# Patient Record
Sex: FEMALE | Race: AMERICAN INDIAN OR ALASKA NATIVE
[De-identification: names, ages, dates, MRNs, and addresses within clinical notes are randomized per-mention and may not be internally consistent; named-entity substitution may affect disease eponyms.]

---

## 2017-09-19 ENCOUNTER — HOSPITAL ENCOUNTER (OUTPATIENT)
Dept: HOSPITAL 45 - C.MAMM | Age: 42
Discharge: HOME | End: 2017-09-19
Attending: INTERNAL MEDICINE
Payer: COMMERCIAL

## 2017-09-19 DIAGNOSIS — Z12.39: Primary | ICD-10-CM

## 2017-09-19 DIAGNOSIS — N60.12: ICD-10-CM

## 2017-09-19 DIAGNOSIS — N60.11: ICD-10-CM

## 2017-09-20 NOTE — MAMMOGRAPHY REPORT
SCREENING ULTRASOUND OF BOTH BREASTS: 9/19/2017

CLINICAL HISTORY: 43-year-old woman presents for breast screening.  She is declining mammography as a
rticles she has read reportedly raised a concern of mammograms causing breast cancer.  Therefore she 
presents for whole breast screening ultrasound.  





COMPARISON: No prior exams were available for comparison.   



FINDINGS:  Real-time high-resolution sonographic evaluation was performed throughout each breast incl
uding the axillae.   Morphologically normal lymph nodes are identified in the right and left axilla, 
without evidence of suspicious lymphadenopathy.



The breast parenchymal echotexture is heterogeneousdense.  In the left 3:00 axis, 5 cm from the nipp
le, there is a lobulated parallel circumscribed hypoechoic benign-appearing solid mass versus promine
nt fat lobule measuring 5.4 x 3.0 x 7.8 mm.  In the 5:00 left breast, 3 cm from the nipple, another l
obulated hypoechoic solid versus cystic mass is identified, measuring 4.6 x 2.7 x 3.9 mm.  A round an
echoic benign simple cyst with posterior acoustic enhancement is identified in the left 7:00 breast, 
2 cm from the nipple, measuring 3.1 x 2.3 x 2.9 mm.  A parallel anechoic benign simple cyst identifie
d in the 6:30 left breast, 2 cm from the nipple measures 3.4 x 1.8 x 5.2 mm.  A grouping of probable 
cysts are identified in the 11:00 left breast, 4 cm from the nipple, measuring approximately 11 mm in
 conglomerate.  No highly suspicious solid mass is identified in the left breast, including the retro
areolar breast.



In the right breast 12:00 axis, 1 cm from the nipple, there is an oval parallel circumscribed anechoi
c benign simple cyst measuring 5.1 x 5.7 x 2.0 mm.  A lobulated hypoechoic solid versus cystic mass i
n the 3:00 right breast, 2 cm from the nipple, measures 5.2 x 3.5 x 3.0 mm.  Hypoechoic nodular tissu
e is seen in the 4:00 right breast, 9 cm from the nipple, measuring approximately 15 mm in conglomera
te, which could simply represent stromal fibrosis or normal tissue.  A tiny round hypoechoic cystic-a
ppearing mass in the 5:00 right breast, 4 cm from the nipple, measures 2.0 x 2.1 x 2.3 mm.  An isoech
oic mass versus prominent fat lobule is seen in the 7:00 right breast, 5 cm from the nipple, measurin
g 9.2 x 3.9 x 7.2 mm.  A parallel isoechoic solid-appearing mass in the 9:00 right breast, 7 cm from 
the nipple, measures 5.1 x 2.3 x 5.1 mm.  No highly suspicious solid mass is seen throughout the righ
t breast including the retroareolar breast.



All of the above described masses in both breasts most likely represent benign fibrocystic changes an
d benign masses such as fibroadenomas.  However, given the indeterminate solid versus cystic nature o
f some of the benign appearing masses, a short interval follow-up targeted bilateral breast ultrasoun
d is recommend to ensure stability in 6 months.



IMPRESSION: ACR-BI-RADS CATEGORY 3: PROBABLY BENIGN - FOLLOW-UP RECOMMENDED

1.  There are fibrocystic changes in both breasts on ultrasound, with several anechoic benign simple 
cysts identified.  2.  However, there are indeterminate solid versus cystic benign-appearing masses v
ersus prominent fat lobules also seen in both breasts for which a short interval follow-up bilateral 
targeted ultrasound is recommended to ensure stability in 6 months.  In particular, attention to the 
right breast 3:00, 4:00, 5:00, 7:00 and 9:00 axes, and left breast 3:00, 5:00 and 11:00 axes.

3.  I also discussed with the patient that mammography is the only proven modality for reducing breas
t cancer mortality and this is the gold standard for screening breast evaluation.  Ultrasound may not
 demonstrate microcalcifications, which are well seen mammographically, and can be the earliest sign 
of atypia or breast cancer.  Articles that I am aware of have not demonstrated screening mammography 
to cause breast cancer.  I would urge her to consider screening mammography in the future, but if she
 still declines that exam whole breast screening ultrasounds seem reasonable although suboptimal.



Megan Duarte M.D.  

ay/:9/19/2017 13:35:16  



Imaging Technologist: Jazmín DON)(TAY), [facility]

letter sent: Follow Up Recommended 3  

BI-RADS Code: ACR-BI-RADS Category 3: Probably Benign

## 2017-09-25 NOTE — CODING QUERY NO DIAGNOSIS
: 1975

TREATMENT RENDERED WITHOUT A DIAGNOSIS                                                  



To promote full compliance with coding requirements relating to patient care, physician 
participation is requested in all cases of  uncertainty.  Please assist us with 
providing a diagnosis/symptom for the test(s) below:



A diagnosis/symptom was not documented on your Order.  A valid diagnosis/symptom is 
required to bill all insurances.



**Please remember that we are unable to code a diagnosis of rule out, probable, possible, 
questionable, or suspected.  



Tests that require a diagnosis:

DOS: 17

* Breast Ultrasound            DIAGNOSIS:

















Provider Signature: _____________________________ Date: _________



Thank you  

Sharon Ham

[employer] [facility]

Phone:  [phone]

Fax:  [phone]



Once completed, please kindly fax back to [phone]



For questions please call [phone]

## 2018-03-26 ENCOUNTER — HOSPITAL ENCOUNTER (OUTPATIENT)
Dept: HOSPITAL 45 - C.MAMM | Age: 43
Discharge: HOME | End: 2018-03-26
Attending: INTERNAL MEDICINE
Payer: COMMERCIAL

## 2018-03-26 DIAGNOSIS — N64.59: ICD-10-CM

## 2018-03-26 DIAGNOSIS — N63.14: ICD-10-CM

## 2018-03-26 DIAGNOSIS — N60.12: Primary | ICD-10-CM

## 2018-03-26 DIAGNOSIS — N63.21: ICD-10-CM

## 2018-03-26 DIAGNOSIS — N63.13: ICD-10-CM

## 2018-03-26 DIAGNOSIS — N63.23: ICD-10-CM

## 2018-03-27 NOTE — MAMMOGRAPHY REPORT
ULTRASOUND OF BOTH BREASTS: 3/26/2018

CLINICAL HISTORY: 42-year-old woman declining mammography screening presents for follow-up targeted u
ltrasound in both breasts for probably benign findings identified on prior whole breast ultrasound.  






COMPARISON: Comparison is made to exam dated:  9/19/2017 ultrasound - [facility].  
 



FINDINGS: Targeted ultrasound was performed in each breast.  In the 3:00 left breast, 5 cm from the n
ipple, there is a partially circumscribed hypoechoic gently lobulated solid-appearing mass measuring 
8.5 x 2.7 x 4.6 mm, previously measured 7.8 x 3.0 x 5.4 mm.  In the 5:00 left breast, 3 cm from the n
ipple, another multilobulated and circumscribed hypoechoic solid versus cystic mass with posterior ac
oustic enhancement is identified measuring 3.9 x 4.0 x 3.8 mm, previously measured 4.6 x 2.7 x 3.9 mm
.  There is a grouping of anechoic benign cysts in the 4:00 left breast, 4 cm from the nipple, measur
ing approximately 1.3 cm in conglomerate.  This finding is benign and more clearly cystic comparing t
o the prior ultrasound.



In the right 3:00 breast, 2 cm from the nipple, a lesion is no longer identified.  In the 4:00 right 
breast, 9 cm from the nipple there is slight textural difference of the tissue which is slightly more
 hypoechoic compared to the adjacent breast tissue on ultrasound but likely represent stromal fibrosi
s.  This area measures 2.3 cm and is visually unchanged although measurements are slightly different.
  A small hypoechoic circumscribed oval mass in the 5:00 right breast, 4 cm from the nipple measures 
2.5 x 2.0 x 2.4 mm, which appears more anechoic and cystic compared to the prior ultrasound.  A paral
lel hypoechoic circumscribed solid-appearing mass in the 7:00 left breast, 5 cm from the nipple measu
res 7.5 x 3.4 x 5.9 mm, previously measured 9.2 x 3.9 x 7.2 mm.  An oval parallel circumscribed hypoe
choic solid-appearing mass in the 9:00 right breast, 7 cm from the nipple measures 4.7 x 2.2 x 5.5 mm
, previously measured 5.1 x 2.2 5.1 mm.



IMPRESSION: ACR-BI-RADS CATEGORY 3: PROBABLY BENIGN - FOLLOW-UP RECOMMENDED

1.  A lesion is no longer identified in the right 3:00 breast, which confirms benignity, and there is
 a conglomerate of benign anechoic cysts in the 11:00 left breast.  No further follow-up is needed re
garding these benign findings.

2.  There are stable benign-appearing solid versus cystic masses in the left breast at 3:00, 5:00 and
 the right breast at 4:00, 5:00, 7:00 and 9:00 that could represent complicated cysts or benign solid
 masses such as fibroadenomas.  Another six-month follow-up targeted ultrasound is recommended for th
fabian lesions in both breasts.

3.  At the time of follow-up would again recommend screening mammography, as this is the only modalit
y proven to decrease breast cancer mortality.  If the patient still declines mammography would then r
ecommend a bilateral whole breast ultrasound for screening at that time (30 minutes).



These results and recommendations were discussed with the patient at the time of the exam.



eMgan Duarte M.D.  

ay/:3/26/2018 17:57:24  



Imaging Technologist: Dr. Megan Duarte, [facility]

letter sent: Follow Up Recommended 3  

BI-RADS Code: ACR-BI-RADS Category 3: Probably Benign